# Patient Record
Sex: MALE | Race: WHITE
[De-identification: names, ages, dates, MRNs, and addresses within clinical notes are randomized per-mention and may not be internally consistent; named-entity substitution may affect disease eponyms.]

---

## 2020-01-28 ENCOUNTER — HOSPITAL ENCOUNTER (EMERGENCY)
Dept: HOSPITAL 84 - D.ER | Age: 30
Discharge: HOME | End: 2020-01-28
Payer: SELF-PAY

## 2020-01-28 VITALS — DIASTOLIC BLOOD PRESSURE: 69 MMHG | SYSTOLIC BLOOD PRESSURE: 109 MMHG

## 2020-01-28 VITALS
HEIGHT: 71 IN | WEIGHT: 190.4 LBS | WEIGHT: 190.4 LBS | BODY MASS INDEX: 26.65 KG/M2 | HEIGHT: 71 IN | BODY MASS INDEX: 26.65 KG/M2

## 2020-01-28 DIAGNOSIS — M25.511: Primary | ICD-10-CM

## 2020-01-28 DIAGNOSIS — M77.9: ICD-10-CM

## 2020-05-13 ENCOUNTER — HOSPITAL ENCOUNTER (EMERGENCY)
Dept: HOSPITAL 84 - D.ER | Age: 30
Discharge: HOME | End: 2020-05-13
Payer: SELF-PAY

## 2020-05-13 VITALS
WEIGHT: 187 LBS | WEIGHT: 187 LBS | HEIGHT: 71 IN | BODY MASS INDEX: 26.18 KG/M2 | HEIGHT: 71 IN | BODY MASS INDEX: 26.18 KG/M2

## 2020-05-13 VITALS — DIASTOLIC BLOOD PRESSURE: 88 MMHG | SYSTOLIC BLOOD PRESSURE: 132 MMHG

## 2020-05-13 DIAGNOSIS — Z72.0: ICD-10-CM

## 2020-05-13 DIAGNOSIS — J02.0: Primary | ICD-10-CM

## 2020-09-08 ENCOUNTER — HOSPITAL ENCOUNTER (EMERGENCY)
Dept: HOSPITAL 84 - D.ER | Age: 30
Discharge: HOME | End: 2020-09-08
Payer: SELF-PAY

## 2020-09-08 VITALS
HEIGHT: 71 IN | WEIGHT: 175.37 LBS | BODY MASS INDEX: 24.55 KG/M2 | HEIGHT: 71 IN | WEIGHT: 175.37 LBS | BODY MASS INDEX: 24.55 KG/M2

## 2020-09-08 VITALS — SYSTOLIC BLOOD PRESSURE: 109 MMHG | DIASTOLIC BLOOD PRESSURE: 73 MMHG

## 2020-09-08 DIAGNOSIS — J02.0: Primary | ICD-10-CM

## 2020-09-08 DIAGNOSIS — J02.9: ICD-10-CM

## 2020-10-17 ENCOUNTER — HOSPITAL ENCOUNTER (EMERGENCY)
Dept: HOSPITAL 84 - D.ER | Age: 30
LOS: 1 days | Discharge: HOME | End: 2020-10-18
Payer: SELF-PAY

## 2020-10-17 VITALS
HEIGHT: 71 IN | WEIGHT: 175.37 LBS | BODY MASS INDEX: 24.55 KG/M2 | WEIGHT: 175.37 LBS | HEIGHT: 71 IN | BODY MASS INDEX: 24.55 KG/M2

## 2020-10-17 DIAGNOSIS — L03.113: ICD-10-CM

## 2020-10-17 DIAGNOSIS — F19.10: Primary | ICD-10-CM

## 2020-10-17 LAB
ERYTHROCYTE [DISTWIDTH] IN BLOOD BY AUTOMATED COUNT: 13.2 % (ref 11.5–14.5)
HCT VFR BLD CALC: 43.6 % (ref 42–54)
HGB BLD-MCNC: 14.9 G/DL (ref 13.5–17.5)
LYMPHOCYTES NFR BLD AUTO: 20 % (ref 15–50)
MCH RBC QN AUTO: 30 PG (ref 26–34)
MCHC RBC AUTO-ENTMCNC: 34.2 G/DL (ref 31–37)
MCV RBC: 87.9 FL (ref 80–100)
NEUTROPHILS NFR BLD AUTO: 65.6 % (ref 40–80)
PLATELET # BLD: 262 10X3/UL (ref 130–400)
PMV BLD AUTO: 8.1 FL (ref 7.4–10.4)
RBC # BLD AUTO: 4.96 10X6/UL (ref 4.2–6.1)
WBC # BLD AUTO: 10.3 10X3/UL (ref 4.8–10.8)

## 2020-10-18 VITALS — DIASTOLIC BLOOD PRESSURE: 81 MMHG | SYSTOLIC BLOOD PRESSURE: 128 MMHG

## 2020-10-18 LAB
ALBUMIN SERPL-MCNC: 3.4 G/DL (ref 3.4–5)
ALP SERPL-CCNC: 79 U/L (ref 30–120)
ALT SERPL-CCNC: 23 U/L (ref 10–68)
ANION GAP SERPL CALC-SCNC: 10.2 MMOL/L (ref 8–16)
BILIRUB SERPL-MCNC: 0.34 MG/DL (ref 0.2–1.3)
BUN SERPL-MCNC: 13 MG/DL (ref 7–18)
CALCIUM SERPL-MCNC: 8.6 MG/DL (ref 8.5–10.1)
CHLORIDE SERPL-SCNC: 101 MMOL/L (ref 98–107)
CO2 SERPL-SCNC: 31.5 MMOL/L (ref 21–32)
CREAT SERPL-MCNC: 1.1 MG/DL (ref 0.6–1.3)
CRP SERPL-MCNC: 6.1 MG/DL (ref 0–0.9)
GLOBULIN SER-MCNC: 3.9 G/L
GLUCOSE SERPL-MCNC: 137 MG/DL (ref 74–106)
OSMOLALITY SERPL CALC.SUM OF ELEC: 279 MOSM/KG (ref 275–300)
POTASSIUM SERPL-SCNC: 3.7 MMOL/L (ref 3.5–5.1)
PROT SERPL-MCNC: 7.3 G/DL (ref 6.4–8.2)
SODIUM SERPL-SCNC: 139 MMOL/L (ref 136–145)

## 2021-03-08 ENCOUNTER — HOSPITAL ENCOUNTER (EMERGENCY)
Dept: HOSPITAL 84 - D.ER | Age: 31
Discharge: LEFT BEFORE DISCHARGE | End: 2021-03-08
Payer: SELF-PAY

## 2021-03-08 VITALS — SYSTOLIC BLOOD PRESSURE: 135 MMHG | DIASTOLIC BLOOD PRESSURE: 82 MMHG

## 2021-03-08 VITALS
HEIGHT: 71 IN | BODY MASS INDEX: 23.15 KG/M2 | HEIGHT: 71 IN | WEIGHT: 165.35 LBS | BODY MASS INDEX: 23.15 KG/M2 | WEIGHT: 165.35 LBS

## 2021-03-08 DIAGNOSIS — L08.9: Primary | ICD-10-CM

## 2021-03-10 ENCOUNTER — HOSPITAL ENCOUNTER (INPATIENT)
Dept: HOSPITAL 84 - D.ER | Age: 31
LOS: 1 days | Discharge: HOME | DRG: 607 | End: 2021-03-11
Attending: FAMILY MEDICINE | Admitting: FAMILY MEDICINE
Payer: MEDICAID

## 2021-03-10 VITALS — SYSTOLIC BLOOD PRESSURE: 108 MMHG | DIASTOLIC BLOOD PRESSURE: 65 MMHG

## 2021-03-10 VITALS
HEIGHT: 71 IN | WEIGHT: 164.34 LBS | BODY MASS INDEX: 23.01 KG/M2 | HEIGHT: 71 IN | BODY MASS INDEX: 23.01 KG/M2 | WEIGHT: 164.34 LBS | BODY MASS INDEX: 23.01 KG/M2

## 2021-03-10 VITALS — DIASTOLIC BLOOD PRESSURE: 50 MMHG | SYSTOLIC BLOOD PRESSURE: 106 MMHG

## 2021-03-10 VITALS — SYSTOLIC BLOOD PRESSURE: 138 MMHG | DIASTOLIC BLOOD PRESSURE: 69 MMHG

## 2021-03-10 VITALS — SYSTOLIC BLOOD PRESSURE: 108 MMHG | DIASTOLIC BLOOD PRESSURE: 74 MMHG

## 2021-03-10 VITALS — SYSTOLIC BLOOD PRESSURE: 117 MMHG | DIASTOLIC BLOOD PRESSURE: 65 MMHG

## 2021-03-10 VITALS — DIASTOLIC BLOOD PRESSURE: 60 MMHG | SYSTOLIC BLOOD PRESSURE: 108 MMHG

## 2021-03-10 DIAGNOSIS — L03.113: ICD-10-CM

## 2021-03-10 DIAGNOSIS — L25.5: Primary | ICD-10-CM

## 2021-03-10 DIAGNOSIS — F19.10: ICD-10-CM

## 2021-03-10 DIAGNOSIS — L01.03: ICD-10-CM

## 2021-03-10 DIAGNOSIS — B95.8: ICD-10-CM

## 2021-03-10 LAB
ALBUMIN SERPL-MCNC: 3.6 G/DL (ref 3.4–5)
ALP SERPL-CCNC: 87 U/L (ref 30–120)
ALT SERPL-CCNC: 31 U/L (ref 10–68)
ANION GAP SERPL CALC-SCNC: 8.7 MMOL/L (ref 8–16)
BASOPHILS NFR BLD AUTO: 0.1 % (ref 0–2)
BILIRUB SERPL-MCNC: 0.23 MG/DL (ref 0.2–1.3)
BUN SERPL-MCNC: 14 MG/DL (ref 7–18)
CALCIUM SERPL-MCNC: 8.7 MG/DL (ref 8.5–10.1)
CHLORIDE SERPL-SCNC: 102 MMOL/L (ref 98–107)
CO2 SERPL-SCNC: 29.8 MMOL/L (ref 21–32)
CREAT SERPL-MCNC: 1.1 MG/DL (ref 0.6–1.3)
EOSINOPHIL NFR BLD: 7.6 % (ref 0–7)
ERYTHROCYTE [DISTWIDTH] IN BLOOD BY AUTOMATED COUNT: 12.6 % (ref 11.5–14.5)
GLOBULIN SER-MCNC: 3.5 G/L
GLUCOSE SERPL-MCNC: 131 MG/DL (ref 74–106)
HCT VFR BLD CALC: 39.3 % (ref 42–54)
HGB BLD-MCNC: 13.6 G/DL (ref 13.5–17.5)
IMM GRANULOCYTES NFR BLD: 0.1 % (ref 0–5)
LYMPHOCYTES # BLD: 1.99 10X3/UL (ref 1.32–3.57)
LYMPHOCYTES NFR BLD AUTO: 24.9 % (ref 15–50)
MCH RBC QN AUTO: 29.4 PG (ref 26–34)
MCHC RBC AUTO-ENTMCNC: 34.6 G/DL (ref 31–37)
MCV RBC: 85.1 FL (ref 80–100)
MONOCYTES NFR BLD: 10.3 % (ref 2–11)
NEUTROPHILS # BLD: 4.56 10X3/UL (ref 1.78–5.38)
NEUTROPHILS NFR BLD AUTO: 57 % (ref 40–80)
OSMOLALITY SERPL CALC.SUM OF ELEC: 276 MOSM/KG (ref 275–300)
PLATELET # BLD: 267 10X3/UL (ref 130–400)
PMV BLD AUTO: 9.4 FL (ref 7.4–10.4)
POTASSIUM SERPL-SCNC: 3.5 MMOL/L (ref 3.5–5.1)
PROT SERPL-MCNC: 7.1 G/DL (ref 6.4–8.2)
RBC # BLD AUTO: 4.62 10X6/UL (ref 4.2–6.1)
SODIUM SERPL-SCNC: 137 MMOL/L (ref 136–145)
WBC # BLD AUTO: 8 10X3/UL (ref 4.8–10.8)

## 2021-03-10 NOTE — NUR
WHEN STARTING VANC, PT ARM STARTED BREAKING OUT IN HIVES, TURNED VANC OFF AND
GAVE BENADRYL PER DOC ORDER

## 2021-03-10 NOTE — NUR
REC'D CHGE OF SHIFT WALKING ROUNDS IN BED EYES CLOSED RESP DEEP ND EVEN.RIGHT
ARM VISIBLE LESS RED AND SWOLLEN UP ON PILLOW WILL CONTINUE TO MONITOR FOR ANY
CHGES AND FOLLOW CURRENT PLAN OF CRE.

## 2021-03-11 VITALS — DIASTOLIC BLOOD PRESSURE: 50 MMHG | SYSTOLIC BLOOD PRESSURE: 108 MMHG

## 2021-03-11 VITALS — DIASTOLIC BLOOD PRESSURE: 54 MMHG | SYSTOLIC BLOOD PRESSURE: 110 MMHG

## 2021-03-11 NOTE — MORECARE
CASE MANAGEMENT DISCHARGE SUMMARY
 
 
PATIENT: FIDE BURRELL                 UNIT: [id number]
ACCOUNT#: [id number]                       ADM DATE: 03/10/21
AGE: 30     : 89  SEX: M            ROOM/BED: D.2205    
AUTHOR: KI WALKER                             PHYSICIAN:                               
 
REFERRING PHYSICIAN: PEPPER CRAFT MD    
DATE OF SERVICE: 21
Discharge Plan
 
 
Patient Name: FIDE BURRELL
Facility: [facility]:[place]
Encounter #: [id number]
Medical Record #: [id number]
: 1990
Planned Disposition: Home or Self Care
Anticipated Discharge Date: 
 
Discharge Date: 
Expected LOS: 
Initial Reviewer: [email or web address]
Initial Review Date: 03/10/2021
Generated: 3/11/21  11:21 am 
Comments
 
DCP- Discharge Planning
 
Updated by [email or web address]: Kimberlee Rojas on 3/11/21   9:21 am CT
Patient Name: FIDE BURRELL                                     
Admission Status: ER   
Accout number: [id number]                              
Admission Date: 03-   
: 1990                                                        
Admission Diagnosis:   
Attending: PEPPER CRAFT                                                
Current LOS:  1   
  
Anticipated DC Date:    
Planned Disposition: Home or Self Care   
Primary Insurance: MED[place] [place]   
  
  
Discharge Planning Comments:   
  
CM met with patient to complete initial dc planning assessment.  CM educated 
patient on the CM role and verbal consent given by patient to complete 
assessment.   Patient lives at  home with his parents where he states he is 
 
independent with his care.  At discharge patient plans to return home and 
feels this is a safe discharge.  CM discussed availability of home health, 
rehab services, and medical equipment. He said that he was trying to get 
ahold of someone to pick him up, but if he can't he will just walk home. He 
said it is not that far.  I offered him a bus ticket and he didn't want it.  
I will still send a bus ticket home with him incase he changes his mind.  He 
said that he already has the abx at home and he has the money to get his 
steroids.  We will give him the left over ointment that he was using at the 
hospital.  Patient denied known discharge needs at this time. CM will 
continue to follow and will assist as needed with dc plans/needs  
  
  
  
  
: Kimberlee Rojas
 DCPIA - Discharge Planning Initial Assessment
 
Updated by [id number]: Kimberlee Rojas on 3/11/21  10:18 am
*  Is the patient Alert and Oriented?
Yes
*  How many steps to enter\exit or inside your home?
 
*  PCP
NONE
*  Pharmacy
[facility]
*  Preadmission Environment
Home with Family
*  ADLs
Independent
*  Equipment
None
*  List name and contact numbers for known caregivers / representatives who 
currently or will assist patient after discharge:
JASVIR [phone]
*  Verbal permission to speak to the caregivers and representatives has been 
obtained from the patient.
N/A
*  Community resources currently utilized
None
*  Additional services required to return to the preadmission environment?
No
*  Can the patient safely return to the preadmission environment?
Yes
*  Has this patient been hospitalized within the prior 30 days at any 
hospital?
No
 
 
 
 
 
 
 
 
Last DP export: 3/11/21   9:07 a
Patient Name: FIDE BURRELL
Encounter #: [id number]
Page [id number]
 
 
 
 
 
Electronically Signed by KI WALKER on 21 at 1022
 
 
 
 
 
 
**All edits/amendments must be made on the electronic document**
 
DICTATION DATE: 21 1021     : LESTER  21 1021     
RPT#: [id number]                                DC DATE:        
                                               STATUS: ADM IN  
[facility]
[address] [place] [address]
***END OF REPORT***

## 2021-03-11 NOTE — MORECARE
CASE MANAGEMENT DISCHARGE SUMMARY
 
 
PATIENT: FIDE BURRELL                 UNIT: [id number]
ACCOUNT#: [id number]                       ADM DATE: 03/10/21
AGE: 30     : 89  SEX: M            ROOM/BED: D.2205    
AUTHOR: KI WALKER                             PHYSICIAN:                               
 
REFERRING PHYSICIAN: PEPPER CRAFT MD    
DATE OF SERVICE: 21
Discharge Plan
 
 
Patient Name: FIDE BURRELL
Facility: [facility]:[place]
Encounter #: [id number]
Medical Record #: [id number]
: 1990
Planned Disposition: Home or Self Care
Anticipated Discharge Date: 
 
Discharge Date: 
Expected LOS: 
Initial Reviewer: [id number]
Initial Review Date: 03/10/2021
Generated: 3/11/21  11:07 am 
  
 
 
 
 
 
 
 
Patient Name: FIDE BURRELL
 
Encounter #: [id number]
Page [id number]
 
 
 
 
 
Electronically Signed by KI WALKER on 21 at 1008
 
 
 
 
 
 
**All edits/amendments must be made on the electronic document**
 
DICTATION DATE: 21 1007     : DM  21 [facility]     
RPT#: [id number]                                DC DATE:        
                                               STATUS: ADM IN  
[facility]
[address] [place] [address]
***END OF REPORT***

## 2021-03-12 NOTE — MORECARE
CASE MANAGEMENT DISCHARGE SUMMARY
 
 
PATIENT: FIDE BURRELL                 UNIT: [id number]
ACCOUNT#: [id number]                       ADM DATE: 03/10/21
AGE: 30     : 89  SEX: M            ROOM/BED: D.2205    
AUTHOR: KI WALKER                             PHYSICIAN:                               
 
REFERRING PHYSICIAN: PEPPER CRAFT MD    
DATE OF SERVICE: 21
Discharge Plan
 
 
Patient Name: FIDE BURRELL
Facility: [facility]:[place]
Encounter #: [id number]
Medical Record #: [id number]
: 1990
Planned Disposition: Home or Self Care
Anticipated Discharge Date: 
 
Discharge Date: 2021
Expected LOS: 
Initial Reviewer: [email or web address]
Initial Review Date: 03/10/2021
Generated: 3/12/21  10:53 am 
Comments
 
DCP- Discharge Planning
 
Updated by [email or web address]: Kimberlee Rojas on 3/11/21   9:21 am CT
Patient Name: FIDE BURRELL                                     
Admission Status: ER   
Accout number: [id number]                              
Admission Date: 03-   
: 1990                                                        
Admission Diagnosis:   
Attending: PEPPER CRAFT                                                
Current LOS:  1   
  
Anticipated DC Date:    
Planned Disposition: Home or Self Care   
Primary Insurance: [place]   
  
  
Discharge Planning Comments:   
  
CM met with patient to complete initial dc planning assessment.  CM educated 
patient on the CM role and verbal consent given by patient to complete 
assessment.   Patient lives at  home with his parents where he states he is 
 
independent with his care.  At discharge patient plans to return home and 
feels this is a safe discharge.  CM discussed availability of home health, 
rehab services, and medical equipment. He said that he was trying to get 
ahold of someone to pick him up, but if he can't he will just walk home. He 
said it is not that far.  I offered him a bus ticket and he didn't want it.  
I will still send a bus ticket home with him incase he changes his mind.  He 
said that he already has the abx at home and he has the money to get his 
steroids.  We will give him the left over ointment that he was using at the 
hospital.  Patient denied known discharge needs at this time. CM will 
continue to follow and will assist as needed with dc plans/needs  
  
  
  
  
: Kimberlee Rojas
 DCPIA - Discharge Planning Initial Assessment
 
Updated by [email or web address]: Kimberlee Rojas on 3/11/21  10:18 am
*  Is the patient Alert and Oriented?
Yes
*  How many steps to enter\exit or inside your home?
 
*  PCP
NONE
*  Pharmacy
[facility]
*  Preadmission Environment
Home with Family
*  ADLs
Independent
*  Equipment
None
*  List name and contact numbers for known caregivers / representatives who 
currently or will assist patient after discharge:
JASVIR [phone]
*  Verbal permission to speak to the caregivers and representatives has been 
obtained from the patient.
N/A
*  Community resources currently utilized
None
*  Additional services required to return to the preadmission environment?
No
*  Can the patient safely return to the preadmission environment?
Yes
*  Has this patient been hospitalized within the prior 30 days at any 
hospital?
No
 
 
 
 
 
 
 
 
Last DP export: 3/11/21   9:22 a
Patient Name: FIDE BURRELL
Encounter #: [id number]
Page [id number]
 
 
 
 
 
Electronically Signed by KI WALKER on 21 at 0953
 
 
 
 
 
 
**All edits/amendments must be made on the electronic document**
 
DICTATION DATE: 21     : LESTER  21     
RPT#: [id number]                                DC DATE:21
                                               STATUS: DIS IN  
[facility]
[address] [place] [address]
***END OF REPORT***

## 2021-06-03 ENCOUNTER — HOSPITAL ENCOUNTER (EMERGENCY)
Dept: HOSPITAL 84 - D.ER | Age: 31
Discharge: HOME | End: 2021-06-03
Payer: MEDICAID

## 2021-06-03 VITALS
WEIGHT: 175.37 LBS | BODY MASS INDEX: 24.55 KG/M2 | HEIGHT: 71 IN | BODY MASS INDEX: 24.55 KG/M2 | HEIGHT: 71 IN | WEIGHT: 175.37 LBS

## 2021-06-03 VITALS — DIASTOLIC BLOOD PRESSURE: 90 MMHG | SYSTOLIC BLOOD PRESSURE: 131 MMHG

## 2021-06-03 DIAGNOSIS — R21: ICD-10-CM
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
## 2021-06-03 DIAGNOSIS — S31.050A: Primary | ICD-10-CM
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
